# Patient Record
Sex: FEMALE | Race: BLACK OR AFRICAN AMERICAN | Employment: UNEMPLOYED | URBAN - METROPOLITAN AREA
[De-identification: names, ages, dates, MRNs, and addresses within clinical notes are randomized per-mention and may not be internally consistent; named-entity substitution may affect disease eponyms.]

---

## 2020-04-04 ENCOUNTER — APPOINTMENT (OUTPATIENT)
Dept: GENERAL RADIOLOGY | Age: 50
DRG: 917 | End: 2020-04-04

## 2020-04-04 ENCOUNTER — HOSPITAL ENCOUNTER (INPATIENT)
Age: 50
LOS: 1 days | Discharge: HOME | DRG: 917 | End: 2020-04-05
Attending: EMERGENCY MEDICINE | Admitting: INTERNAL MEDICINE

## 2020-04-04 PROBLEM — T50.901A DRUG OVERDOSE: Status: ACTIVE | Noted: 2020-04-04

## 2020-04-04 PROBLEM — G93.41 ACUTE METABOLIC ENCEPHALOPATHY: Status: ACTIVE | Noted: 2020-04-04

## 2020-04-04 PROBLEM — I48.91 ATRIAL FIBRILLATION WITH RVR (HCC): Status: ACTIVE | Noted: 2020-04-04

## 2020-04-04 PROBLEM — E87.20 METABOLIC ACIDOSIS: Status: ACTIVE | Noted: 2020-04-04

## 2020-04-04 PROBLEM — R65.10 SIRS (SYSTEMIC INFLAMMATORY RESPONSE SYNDROME) (HCC): Status: ACTIVE | Noted: 2020-04-04

## 2020-04-04 LAB
A/G RATIO: 1.4 (ref 1.1–2.2)
ALBUMIN SERPL-MCNC: 4.5 G/DL (ref 3.4–5)
ALP BLD-CCNC: 81 U/L (ref 40–129)
ALT SERPL-CCNC: 22 U/L (ref 10–40)
AMPHETAMINE SCREEN, URINE: ABNORMAL
ANION GAP SERPL CALCULATED.3IONS-SCNC: 15 MMOL/L (ref 3–16)
ANION GAP SERPL CALCULATED.3IONS-SCNC: 26 MMOL/L (ref 3–16)
AST SERPL-CCNC: 35 U/L (ref 15–37)
BARBITURATE SCREEN URINE: ABNORMAL
BASOPHILS ABSOLUTE: 0.1 K/UL (ref 0–0.2)
BASOPHILS RELATIVE PERCENT: 0.6 %
BENZODIAZEPINE SCREEN, URINE: ABNORMAL
BILIRUB SERPL-MCNC: <0.2 MG/DL (ref 0–1)
BILIRUBIN URINE: NEGATIVE
BLOOD, URINE: ABNORMAL
BUN BLDV-MCNC: 10 MG/DL (ref 7–20)
BUN BLDV-MCNC: 11 MG/DL (ref 7–20)
CALCIUM SERPL-MCNC: 8.1 MG/DL (ref 8.3–10.6)
CALCIUM SERPL-MCNC: 9.3 MG/DL (ref 8.3–10.6)
CANNABINOID SCREEN URINE: ABNORMAL
CHLORIDE BLD-SCNC: 102 MMOL/L (ref 99–110)
CHLORIDE BLD-SCNC: 108 MMOL/L (ref 99–110)
CLARITY: ABNORMAL
CO2: 14 MMOL/L (ref 21–32)
CO2: 20 MMOL/L (ref 21–32)
COCAINE METABOLITE SCREEN URINE: POSITIVE
COLOR: YELLOW
COMMENT UA: ABNORMAL
CREAT SERPL-MCNC: 0.6 MG/DL (ref 0.6–1.1)
CREAT SERPL-MCNC: 1 MG/DL (ref 0.6–1.1)
EKG ATRIAL RATE: 119 BPM
EKG DIAGNOSIS: NORMAL
EKG Q-T INTERVAL: 322 MS
EKG QRS DURATION: 86 MS
EKG QTC CALCULATION (BAZETT): 471 MS
EKG R AXIS: 7 DEGREES
EKG T AXIS: 8 DEGREES
EKG VENTRICULAR RATE: 129 BPM
EOSINOPHILS ABSOLUTE: 0.3 K/UL (ref 0–0.6)
EOSINOPHILS RELATIVE PERCENT: 1.8 %
EPITHELIAL CELLS, UA: 6 /HPF (ref 0–5)
ESTIMATED AVERAGE GLUCOSE: 99.7 MG/DL
GFR AFRICAN AMERICAN: >60
GFR AFRICAN AMERICAN: >60
GFR NON-AFRICAN AMERICAN: 59
GFR NON-AFRICAN AMERICAN: >60
GLOBULIN: 3.3 G/DL
GLUCOSE BLD-MCNC: 113 MG/DL (ref 70–99)
GLUCOSE BLD-MCNC: 132 MG/DL (ref 70–99)
GLUCOSE BLD-MCNC: 296 MG/DL (ref 70–99)
GLUCOSE BLD-MCNC: 87 MG/DL (ref 70–99)
GLUCOSE BLD-MCNC: 95 MG/DL (ref 70–99)
GLUCOSE URINE: 250 MG/DL
HBA1C MFR BLD: 5.1 %
HCT VFR BLD CALC: 44.9 % (ref 36–48)
HEMOGLOBIN: 13.5 G/DL (ref 12–16)
HYALINE CASTS: 5 /LPF (ref 0–8)
KETONES, URINE: NEGATIVE MG/DL
LACTIC ACID: 1.5 MMOL/L (ref 0.4–2)
LACTIC ACID: 1.5 MMOL/L (ref 0.4–2)
LACTIC ACID: 1.6 MMOL/L (ref 0.4–2)
LACTIC ACID: 2 MMOL/L (ref 0.4–2)
LACTIC ACID: 3.6 MMOL/L (ref 0.4–2)
LEUKOCYTE ESTERASE, URINE: NEGATIVE
LIPASE: 42 U/L (ref 13–60)
LV EF: 58 %
LVEF MODALITY: NORMAL
LYMPHOCYTES ABSOLUTE: 3.4 K/UL (ref 1–5.1)
LYMPHOCYTES RELATIVE PERCENT: 18.7 %
Lab: ABNORMAL
MCH RBC QN AUTO: 29.4 PG (ref 26–34)
MCHC RBC AUTO-ENTMCNC: 30.1 G/DL (ref 31–36)
MCV RBC AUTO: 97.5 FL (ref 80–100)
METHADONE SCREEN, URINE: ABNORMAL
MICROSCOPIC EXAMINATION: YES
MONOCYTES ABSOLUTE: 0.4 K/UL (ref 0–1.3)
MONOCYTES RELATIVE PERCENT: 2.1 %
NEUTROPHILS ABSOLUTE: 14 K/UL (ref 1.7–7.7)
NEUTROPHILS RELATIVE PERCENT: 76.8 %
NITRITE, URINE: NEGATIVE
OPIATE SCREEN URINE: ABNORMAL
OXYCODONE URINE: ABNORMAL
PDW BLD-RTO: 15.7 % (ref 12.4–15.4)
PERFORMED ON: ABNORMAL
PERFORMED ON: ABNORMAL
PERFORMED ON: NORMAL
PH UA: 6
PH UA: 6 (ref 5–8)
PHENCYCLIDINE SCREEN URINE: ABNORMAL
PLATELET # BLD: 326 K/UL (ref 135–450)
PMV BLD AUTO: 7 FL (ref 5–10.5)
POTASSIUM REFLEX MAGNESIUM: 4 MMOL/L (ref 3.5–5.1)
POTASSIUM SERPL-SCNC: 3.7 MMOL/L (ref 3.5–5.1)
PRO-BNP: 22 PG/ML (ref 0–124)
PROPOXYPHENE SCREEN: ABNORMAL
PROTEIN UA: ABNORMAL MG/DL
RBC # BLD: 4.6 M/UL (ref 4–5.2)
RBC UA: ABNORMAL /HPF (ref 0–4)
SODIUM BLD-SCNC: 142 MMOL/L (ref 136–145)
SODIUM BLD-SCNC: 143 MMOL/L (ref 136–145)
SPECIFIC GRAVITY UA: 1.01 (ref 1–1.03)
TOTAL CK: 221 U/L (ref 26–192)
TOTAL PROTEIN: 7.8 G/DL (ref 6.4–8.2)
TROPONIN: 0.02 NG/ML
TROPONIN: 0.02 NG/ML
TROPONIN: <0.01 NG/ML
TROPONIN: <0.01 NG/ML
TSH REFLEX: 3.73 UIU/ML (ref 0.27–4.2)
URINE REFLEX TO CULTURE: YES
URINE TYPE: ABNORMAL
UROBILINOGEN, URINE: 0.2 E.U./DL
WBC # BLD: 18.3 K/UL (ref 4–11)
WBC UA: 11 /HPF (ref 0–5)

## 2020-04-04 PROCEDURE — 83036 HEMOGLOBIN GLYCOSYLATED A1C: CPT

## 2020-04-04 PROCEDURE — 83880 ASSAY OF NATRIURETIC PEPTIDE: CPT

## 2020-04-04 PROCEDURE — 71045 X-RAY EXAM CHEST 1 VIEW: CPT

## 2020-04-04 PROCEDURE — 2060000000 HC ICU INTERMEDIATE R&B

## 2020-04-04 PROCEDURE — 84484 ASSAY OF TROPONIN QUANT: CPT

## 2020-04-04 PROCEDURE — 83690 ASSAY OF LIPASE: CPT

## 2020-04-04 PROCEDURE — 83605 ASSAY OF LACTIC ACID: CPT

## 2020-04-04 PROCEDURE — 87086 URINE CULTURE/COLONY COUNT: CPT

## 2020-04-04 PROCEDURE — 93005 ELECTROCARDIOGRAM TRACING: CPT | Performed by: EMERGENCY MEDICINE

## 2020-04-04 PROCEDURE — 6370000000 HC RX 637 (ALT 250 FOR IP): Performed by: INTERNAL MEDICINE

## 2020-04-04 PROCEDURE — 2580000003 HC RX 258: Performed by: INTERNAL MEDICINE

## 2020-04-04 PROCEDURE — 93010 ELECTROCARDIOGRAM REPORT: CPT | Performed by: INTERNAL MEDICINE

## 2020-04-04 PROCEDURE — 99291 CRITICAL CARE FIRST HOUR: CPT

## 2020-04-04 PROCEDURE — 2500000003 HC RX 250 WO HCPCS: Performed by: INTERNAL MEDICINE

## 2020-04-04 PROCEDURE — 84443 ASSAY THYROID STIM HORMONE: CPT

## 2020-04-04 PROCEDURE — 85025 COMPLETE CBC W/AUTO DIFF WBC: CPT

## 2020-04-04 PROCEDURE — 2500000003 HC RX 250 WO HCPCS: Performed by: EMERGENCY MEDICINE

## 2020-04-04 PROCEDURE — 96360 HYDRATION IV INFUSION INIT: CPT

## 2020-04-04 PROCEDURE — 80307 DRUG TEST PRSMV CHEM ANLYZR: CPT

## 2020-04-04 PROCEDURE — 2580000003 HC RX 258: Performed by: EMERGENCY MEDICINE

## 2020-04-04 PROCEDURE — 82550 ASSAY OF CK (CPK): CPT

## 2020-04-04 PROCEDURE — 6360000002 HC RX W HCPCS: Performed by: INTERNAL MEDICINE

## 2020-04-04 PROCEDURE — 80053 COMPREHEN METABOLIC PANEL: CPT

## 2020-04-04 PROCEDURE — 81001 URINALYSIS AUTO W/SCOPE: CPT

## 2020-04-04 PROCEDURE — 36415 COLL VENOUS BLD VENIPUNCTURE: CPT

## 2020-04-04 PROCEDURE — 99255 IP/OBS CONSLTJ NEW/EST HI 80: CPT | Performed by: INTERNAL MEDICINE

## 2020-04-04 PROCEDURE — 93306 TTE W/DOPPLER COMPLETE: CPT

## 2020-04-04 PROCEDURE — 96361 HYDRATE IV INFUSION ADD-ON: CPT

## 2020-04-04 RX ORDER — PROMETHAZINE HYDROCHLORIDE 25 MG/1
12.5 TABLET ORAL EVERY 6 HOURS PRN
Status: DISCONTINUED | OUTPATIENT
Start: 2020-04-04 | End: 2020-04-05 | Stop reason: HOSPADM

## 2020-04-04 RX ORDER — ACETAMINOPHEN 650 MG/1
650 SUPPOSITORY RECTAL EVERY 6 HOURS PRN
Status: DISCONTINUED | OUTPATIENT
Start: 2020-04-04 | End: 2020-04-05 | Stop reason: HOSPADM

## 2020-04-04 RX ORDER — SODIUM CHLORIDE 0.9 % (FLUSH) 0.9 %
10 SYRINGE (ML) INJECTION EVERY 12 HOURS SCHEDULED
Status: DISCONTINUED | OUTPATIENT
Start: 2020-04-04 | End: 2020-04-05 | Stop reason: HOSPADM

## 2020-04-04 RX ORDER — ONDANSETRON 2 MG/ML
4 INJECTION INTRAMUSCULAR; INTRAVENOUS EVERY 6 HOURS PRN
Status: DISCONTINUED | OUTPATIENT
Start: 2020-04-04 | End: 2020-04-05 | Stop reason: HOSPADM

## 2020-04-04 RX ORDER — 0.9 % SODIUM CHLORIDE 0.9 %
1000 INTRAVENOUS SOLUTION INTRAVENOUS ONCE
Status: COMPLETED | OUTPATIENT
Start: 2020-04-04 | End: 2020-04-04

## 2020-04-04 RX ORDER — NICOTINE 21 MG/24HR
1 PATCH, TRANSDERMAL 24 HOURS TRANSDERMAL DAILY PRN
Status: DISCONTINUED | OUTPATIENT
Start: 2020-04-04 | End: 2020-04-05 | Stop reason: HOSPADM

## 2020-04-04 RX ORDER — DEXTROSE MONOHYDRATE 25 G/50ML
12.5 INJECTION, SOLUTION INTRAVENOUS PRN
Status: DISCONTINUED | OUTPATIENT
Start: 2020-04-04 | End: 2020-04-05 | Stop reason: HOSPADM

## 2020-04-04 RX ORDER — 0.9 % SODIUM CHLORIDE 0.9 %
500 INTRAVENOUS SOLUTION INTRAVENOUS ONCE
Status: COMPLETED | OUTPATIENT
Start: 2020-04-04 | End: 2020-04-04

## 2020-04-04 RX ORDER — NICOTINE POLACRILEX 4 MG
15 LOZENGE BUCCAL PRN
Status: DISCONTINUED | OUTPATIENT
Start: 2020-04-04 | End: 2020-04-05 | Stop reason: HOSPADM

## 2020-04-04 RX ORDER — DEXTROSE MONOHYDRATE 50 MG/ML
100 INJECTION, SOLUTION INTRAVENOUS PRN
Status: DISCONTINUED | OUTPATIENT
Start: 2020-04-04 | End: 2020-04-05 | Stop reason: HOSPADM

## 2020-04-04 RX ORDER — SODIUM CHLORIDE 9 MG/ML
INJECTION, SOLUTION INTRAVENOUS CONTINUOUS
Status: DISCONTINUED | OUTPATIENT
Start: 2020-04-04 | End: 2020-04-05 | Stop reason: HOSPADM

## 2020-04-04 RX ORDER — DILTIAZEM HYDROCHLORIDE 5 MG/ML
10 INJECTION INTRAVENOUS ONCE
Status: COMPLETED | OUTPATIENT
Start: 2020-04-04 | End: 2020-04-04

## 2020-04-04 RX ORDER — ACETAMINOPHEN 325 MG/1
650 TABLET ORAL EVERY 6 HOURS PRN
Status: DISCONTINUED | OUTPATIENT
Start: 2020-04-04 | End: 2020-04-05 | Stop reason: HOSPADM

## 2020-04-04 RX ORDER — CARVEDILOL 6.25 MG/1
6.25 TABLET ORAL 2 TIMES DAILY WITH MEALS
Status: DISCONTINUED | OUTPATIENT
Start: 2020-04-04 | End: 2020-04-05 | Stop reason: HOSPADM

## 2020-04-04 RX ORDER — SODIUM CHLORIDE 0.9 % (FLUSH) 0.9 %
10 SYRINGE (ML) INJECTION PRN
Status: DISCONTINUED | OUTPATIENT
Start: 2020-04-04 | End: 2020-04-05 | Stop reason: HOSPADM

## 2020-04-04 RX ORDER — POLYETHYLENE GLYCOL 3350 17 G/17G
17 POWDER, FOR SOLUTION ORAL DAILY PRN
Status: DISCONTINUED | OUTPATIENT
Start: 2020-04-04 | End: 2020-04-05 | Stop reason: HOSPADM

## 2020-04-04 RX ADMIN — Medication 10 ML: at 09:13

## 2020-04-04 RX ADMIN — SODIUM CHLORIDE 500 ML: 9 INJECTION, SOLUTION INTRAVENOUS at 15:35

## 2020-04-04 RX ADMIN — SODIUM CHLORIDE 500 ML: 9 INJECTION, SOLUTION INTRAVENOUS at 03:38

## 2020-04-04 RX ADMIN — ENOXAPARIN SODIUM 40 MG: 40 INJECTION SUBCUTANEOUS at 09:13

## 2020-04-04 RX ADMIN — DILTIAZEM HYDROCHLORIDE 2.5 MG/HR: 5 INJECTION INTRAVENOUS at 17:10

## 2020-04-04 RX ADMIN — SODIUM CHLORIDE: 9 INJECTION, SOLUTION INTRAVENOUS at 09:14

## 2020-04-04 RX ADMIN — CARVEDILOL 6.25 MG: 6.25 TABLET, FILM COATED ORAL at 17:34

## 2020-04-04 RX ADMIN — DILTIAZEM HYDROCHLORIDE 5 MG/HR: 5 INJECTION INTRAVENOUS at 08:35

## 2020-04-04 RX ADMIN — SODIUM CHLORIDE: 9 INJECTION, SOLUTION INTRAVENOUS at 19:03

## 2020-04-04 RX ADMIN — SODIUM CHLORIDE 1000 ML: 9 INJECTION, SOLUTION INTRAVENOUS at 05:25

## 2020-04-04 RX ADMIN — DILTIAZEM HYDROCHLORIDE 10 MG: 5 INJECTION INTRAVENOUS at 06:28

## 2020-04-04 ASSESSMENT — ENCOUNTER SYMPTOMS
ABDOMINAL PAIN: 0
VOMITING: 0
TROUBLE SWALLOWING: 0
COUGH: 0
COLOR CHANGE: 0
SHORTNESS OF BREATH: 0
PHOTOPHOBIA: 0

## 2020-04-04 NOTE — H&P
exam:->sob Reason for Exam: sob; possible overdose; FINDINGS: The lungs are clear. The costophrenic angles are sharp. The heart size is at the upper limits of normal.  There is no discernible pneumothorax. No acute disease. EKG:  afib with RVR, rate 910    PHYSICIAN CERTIFICATION    I certify that Jaya De La Paz is expected to be hospitalized for <2 midnights based on the following assessment and plan:      ASSESSMENT:      Patient Active Problem List   Diagnosis    Uterine fibroid    Tobacco abuse    Atrial fibrillation with RVR ([address]    Drug overdose    SIRS (systemic inflammatory response syndrome) ([address]    Metabolic acidosis    Acute metabolic encephalopathy       Principal Problem:    Atrial fibrillation with RVR (HCC)  Active Problems:    Tobacco abuse    Drug overdose    SIRS (systemic inflammatory response syndrome) (HCC)    Metabolic acidosis    Acute metabolic encephalopathy  Resolved Problems:    * No resolved hospital problems. *      PLAN:    1. Atrial fib with RVR - avoid beta blockers - currently on dilt drip - her CHADS VASC score only one - cardiology consult - could likely be converted to oral cardizem soon and sent home once rate slows  2. Drug overdose - narcan if needed - likely cocaine was cut with fentanyl or heroid  3. SIRS - likely due to overdose with possible aspiration - tachycardia, leukocytosis  4. Metabolic acidosis - likely due to   5.   Acute metabolic encephalopathy - likely due to overdose  6.  tob abuse - nicotine patch  7.  Elevated glucose - check accuchecks and hgba1c    DVT prophylaxis Yes:  lovenox  GI prophylaxis no  Antibiotic prophylaxis:  No    Code status FULL        Please note that over 50 minutes was spent in evaluating the patient, review of records and results, discussion with staff/family, etc.    Electronically signed by Sylvia Armendariz MD on 4/4/2020 at 6:17 AM

## 2020-04-04 NOTE — ED TRIAGE NOTES
Pt found unresponsive in car with O2 sats in the 70's and pinpoint pupils. Pt responded with 2mg of narcan IM. Pt denies drug use.

## 2020-04-04 NOTE — ED PROVIDER NOTES
as hypoglycemia and hyperglycemia    Alterations in perfusions such as hypotension and hypoperfusion    Alterations in electrolytes such as disturbances in sodium or calcium   Infectious processes such as sepsis from a pneumonia or urinary tract infection    Substance use or withdrawal, especially alcohol and drugs    Medication adverse event or interaction    Vitamin deficiencies such as Wernicke's encephalopathy    CNS lesion, injury, infection (CVA, subdural hematoma, meningitis, encephalitis)    Alterations in hormones such as thyroid or adrenal abnormalities    Alterations in cardiac functioning such as arrhythmia, MI or CHF    Alteration in temperature such as hyperthermia or hypothermia    Dehydration, sleep deprivation   Change in medical regimen    Alteration in lifestyle, environment, or personal relationships    -  Work-up included:  See above  -  ED treatment included: See above  -  Results discussed with patient. Labs show anion gap metabolic acidosis as well as hyperglycemia. I suspect this is from a lactic acidosis from the patient's prolonged hypoxia secondary to narcotic overdose. There are no ketones in the patient's urine have a low suspicion for DKA. Patient given 2 L of normal saline and remains in atrial fibrillation with a heart rate of 120-140. imaging studies show no acute cardiopulmonary disease. Patient feels well on reevaluation. Initially she was resistant to admission to the hospital but remained in persistent atrial fibrillation with RVR. Repeat BMP and lactate were ordered after completion of the patient's normal saline. The patient is agreeable with plan of care and disposition.  -  Disposition:  Admission      REASSESSMENT          CRITICAL CARE TIME   Total Critical Care time was 35 minutes, excluding separatelyreportable procedures.   There was a high probability ofclinically significant/life threatening deterioration in the patient's condition which required my urgent

## 2020-04-04 NOTE — ED NOTES
Pt o2 sats dipping into the 80's, er doc made aware.  Pt placed on o2 per nc at [place] [id number], [address] [facility]  04/04/20 6121

## 2020-04-04 NOTE — PLAN OF CARE
Problem: Activity:  Goal: Ability to tolerate increased activity will improve  Description: Ability to tolerate increased activity will improve  Outcome: Ongoing     Problem: Activity:  Goal: Expression of feelings of increased energy will increase  Description: Expression of feelings of increased energy will increase  Outcome: Ongoing     Problem: Cardiac:  Goal: Ability to maintain an adequate cardiac output will improve  Description: Ability to maintain an adequate cardiac output will improve  Outcome: Ongoing     Problem: Coping:  Goal: Level of anxiety will decrease  Description: Level of anxiety will decrease  Outcome: Ongoing     Problem: Coping:  Goal: General experience of comfort will improve  Description: General experience of comfort will improve  Outcome: Ongoing     Problem: Safety:  Goal: Ability to remain free from injury will improve  Description: Ability to remain free from injury will improve  Outcome: Ongoing   Patient assessed for fall risk; fall precautions initiated. Patient and family instructed about safety devices. Environment kept free of clutter and adequate lighting provided. Bed locked and in lowest position. Call light within reach. Will continue to monitor.     Electronically signed by Kevin Lombardo RN on 4/4/2020 at 4:11 PM

## 2020-04-04 NOTE — CONSULTS
44.9        [id number] Score for Atrial Fibrillation Stroke Risk   Risk   Factors  Component Value   C CHF No 0   H HTN No 0   A2 Age >= 75 No,  (48 y.o.) 0   D DM No 0   S2 Prior Stroke/TIA No 0   V Vascular Disease No 0   A Age 74-69 No,  (48 y.o.) 0   Sc Sex female 1    [id number]  Score  1   Score last updated 4/4/20 2:05 AM EDT    Click here for a link to the UpToDate guideline \"Atrial Fibrillation: Anticoagulation therapy to prevent embolization    Disclaimer: Risk Score calculation is dependent on accuracy of patient problem list and past encounter diagnosis. Assessment:  1. Atrial fibrillation with RVR  2. Drug Overdose  3. Cocaine Abuse    Plan:  Cheyenne Doan may have had a-fib for some time or it may be from cocaine abuse. I briefly reviewed her echocardiogram which demonstrated normal LV/RV function and no significant valve abnormalities. I would control her rate with carvedilol 6.25mg bid now and discontinue the Cardizem gtt. Carvedilol over metoprolol due to cocaine abuse and need for alpha blockade. No need for anticoagulation given low CHADS Vasc risk. She can follow-up in our office. She states that she is embarassed by this incident and sawyer not use drugs any more. If her rate is controlled tomorrow she could be discharged to home.

## 2020-04-04 NOTE — PROGRESS NOTES
Patient arrived to room 5129 via stretcher at 6175. A/Ox4, VSS, telemetry box 70 applied and verified Afib with CMU. Call light and bedside table within reach.     Electronically signed by Gretchen Hernandez RN on 4/4/2020 at 9:07 AM

## 2020-04-04 NOTE — PROGRESS NOTES
4 Eyes Skin Assessment     The patient is being assess for  Admission    I agree that 2 RN's have performed a thorough Head to Toe Skin Assessment on the patient. ALL assessment sites listed below have been assessed. Areas assessed by both nurses:   [x]   Head, Face, and Ears   [x]   Shoulders, Back, and Chest  [x]   Arms, Elbows, and Hands   [x]   Coccyx, Sacrum, and IschIum  [x]   Legs, Feet, and Heels        Does the Patient have Skin Breakdown?   No         Eric Prevention initiated:  No   Wound Care Orders initiated:  No      [facility] nurse consulted for Pressure Injury (Stage 3,4, Unstageable, DTI, NWPT, and Complex wounds), New and Established Ostomies:  No      Nurse 1 eSignature: Electronically signed by Noah Guzman RN on 4/4/20 at 5:41 PM EDT    **SHARE this note so that the co-signing nurse is able to place an eSignature**    Nurse 2 eSignature: Electronically signed by Sreedhar Wick RN on 4/4/20 at 5:42 PM EDT

## 2020-04-04 NOTE — PROGRESS NOTES
Please review Dr. Elmira Ross H&P for further information. Patient was seen this morning with Dr. Elmira Ross. This note is to reflect transition of care. Brief assessment and plan:    1. Atrial fibrillation with RVR  -Maintain Cardizem drip for rate control  -Patient is currently on Lovenox for anticoagulation. Await cardiology recommendations.  -Follow-up echocardiogram    2. Drug overdose-drug tox screen positive for cocaine. She has hx recurrent use  -Resolving and improvement in mental status  -Continue IV hydration  -Cessation of drug use advised    3. Anion gap metabolic acidosis-likely due to sepsis  -Improved. Continue IV hydration. 4. SIRS POA-possibly autonomic response. Await blood and urine cultures. Chest x-ray was clear. I will place on antibiotics if patient becomes febrile. 5.  Hyperglycemia-continue glucose checks. Patient has coverage insulin. Follow-up A1c    Disposition-possibly discharge in 2 to 3 days.

## 2020-04-05 VITALS
DIASTOLIC BLOOD PRESSURE: 73 MMHG | HEIGHT: 64 IN | OXYGEN SATURATION: 94 % | BODY MASS INDEX: 24.09 KG/M2 | WEIGHT: 141.09 LBS | RESPIRATION RATE: 16 BRPM | HEART RATE: 86 BPM | SYSTOLIC BLOOD PRESSURE: 116 MMHG | TEMPERATURE: 98 F

## 2020-04-05 LAB
ANION GAP SERPL CALCULATED.3IONS-SCNC: 13 MMOL/L (ref 3–16)
BUN BLDV-MCNC: 12 MG/DL (ref 7–20)
CALCIUM SERPL-MCNC: 8.6 MG/DL (ref 8.3–10.6)
CHLORIDE BLD-SCNC: 106 MMOL/L (ref 99–110)
CO2: 22 MMOL/L (ref 21–32)
CREAT SERPL-MCNC: 0.7 MG/DL (ref 0.6–1.1)
GFR AFRICAN AMERICAN: >60
GFR NON-AFRICAN AMERICAN: >60
GLUCOSE BLD-MCNC: 108 MG/DL (ref 70–99)
GLUCOSE BLD-MCNC: 113 MG/DL (ref 70–99)
GLUCOSE BLD-MCNC: 138 MG/DL (ref 70–99)
INR BLD: 1.17 (ref 0.86–1.14)
MAGNESIUM: 1.9 MG/DL (ref 1.8–2.4)
PERFORMED ON: ABNORMAL
PERFORMED ON: ABNORMAL
POTASSIUM SERPL-SCNC: 3.5 MMOL/L (ref 3.5–5.1)
PROTHROMBIN TIME: 13.6 SEC (ref 10–13.2)
SODIUM BLD-SCNC: 141 MMOL/L (ref 136–145)
URINE CULTURE, ROUTINE: NORMAL

## 2020-04-05 PROCEDURE — 36415 COLL VENOUS BLD VENIPUNCTURE: CPT

## 2020-04-05 PROCEDURE — 2580000003 HC RX 258: Performed by: INTERNAL MEDICINE

## 2020-04-05 PROCEDURE — 6370000000 HC RX 637 (ALT 250 FOR IP): Performed by: INTERNAL MEDICINE

## 2020-04-05 PROCEDURE — 6360000002 HC RX W HCPCS: Performed by: INTERNAL MEDICINE

## 2020-04-05 PROCEDURE — 85610 PROTHROMBIN TIME: CPT

## 2020-04-05 PROCEDURE — 99232 SBSQ HOSP IP/OBS MODERATE 35: CPT | Performed by: NURSE PRACTITIONER

## 2020-04-05 PROCEDURE — 80048 BASIC METABOLIC PNL TOTAL CA: CPT

## 2020-04-05 PROCEDURE — 83735 ASSAY OF MAGNESIUM: CPT

## 2020-04-05 PROCEDURE — 6370000000 HC RX 637 (ALT 250 FOR IP): Performed by: NURSE PRACTITIONER

## 2020-04-05 RX ORDER — CARVEDILOL 6.25 MG/1
6.25 TABLET ORAL 2 TIMES DAILY WITH MEALS
Qty: 60 TABLET | Refills: 3 | Status: SHIPPED | OUTPATIENT
Start: 2020-04-05

## 2020-04-05 RX ORDER — CETIRIZINE HYDROCHLORIDE 10 MG/1
5 TABLET ORAL DAILY PRN
Status: DISCONTINUED | OUTPATIENT
Start: 2020-04-05 | End: 2020-04-05 | Stop reason: HOSPADM

## 2020-04-05 RX ORDER — TRAMADOL HYDROCHLORIDE 50 MG/1
50 TABLET ORAL PRN
Status: DISCONTINUED | OUTPATIENT
Start: 2020-04-05 | End: 2020-04-05 | Stop reason: HOSPADM

## 2020-04-05 RX ORDER — CLONIDINE HYDROCHLORIDE 0.1 MG/1
0.1 TABLET ORAL PRN
Status: DISCONTINUED | OUTPATIENT
Start: 2020-04-05 | End: 2020-04-05 | Stop reason: HOSPADM

## 2020-04-05 RX ORDER — TRAZODONE HYDROCHLORIDE 50 MG/1
50 TABLET ORAL NIGHTLY PRN
Status: DISCONTINUED | OUTPATIENT
Start: 2020-04-05 | End: 2020-04-05 | Stop reason: HOSPADM

## 2020-04-05 RX ADMIN — ENOXAPARIN SODIUM 40 MG: 40 INJECTION SUBCUTANEOUS at 08:34

## 2020-04-05 RX ADMIN — CETIRIZINE HYDROCHLORIDE 5 MG: 10 TABLET, FILM COATED ORAL at 12:10

## 2020-04-05 RX ADMIN — CARVEDILOL 6.25 MG: 6.25 TABLET, FILM COATED ORAL at 08:34

## 2020-04-05 RX ADMIN — SODIUM CHLORIDE: 9 INJECTION, SOLUTION INTRAVENOUS at 09:00

## 2020-04-05 RX ADMIN — Medication 10 ML: at 08:39

## 2020-04-05 ASSESSMENT — PAIN SCALES - GENERAL: PAINLEVEL_OUTOF10: 0

## 2020-04-05 NOTE — DISCHARGE SUMMARY
Hospitalist Discharge Summary    Patient ID:  Karely Lewis  [id number]  52 y.o.  1970    Admit date: 4/4/2020    Discharge date: 4/5/2020    Disposition: home    Admission Diagnoses:   Patient Active Problem List   Diagnosis    Uterine fibroid    Tobacco abuse    Atrial fibrillation with RVR ([address]    Drug overdose    SIRS (systemic inflammatory response syndrome) (HCC)    Metabolic acidosis    Acute metabolic encephalopathy       Discharge Diagnoses: Principal Problem:    Atrial fibrillation with RVR (HCC)  Active Problems:    Tobacco abuse    Drug overdose    SIRS (systemic inflammatory response syndrome) (HCC)    Metabolic acidosis    Acute metabolic encephalopathy  Resolved Problems:    * No resolved hospital problems. *      Code Status:  Full Code    Condition:  Stable    Discharge Diet: Diet:  DIET GENERAL;    PCP to do list:        Hospital Course:     Patient was brought here after she was found unresponsive in her car. She received Narcan secondary to possible drug overdose. He developed new onset atrial fibrillation. She was seen by cardiology. She was treated with Cardizem drip. Today she was switched to carvedilol . She is doing better and she is being discharged to follow-up with her primary care as an outpatient. Atrial fibrillation with RVR  Cardizem drip was stopped this morning. Rate control with carvedilol.   Echocardiogram normal ejection fraction without any significant findings  Continue with carvedilol  [id number] Score is low no need for anticoagulation     Drug overdose  Drug screen was positive for cocaine  Counseling was provided  No signs of withdrawal at this moment     High anion gap metabolic acidosis:  Improved     SIRS:  Present on admission  Afebrile  No signs of infection  Patient is not septic  Most likely secondary to drug overdose     Hyperglycemia        Lab Results   Component Value Date     LABA1C 5.1 04/04/2020   No

## 2020-04-05 NOTE — PROGRESS NOTES
[address]   Daily Progress Note      Admit Date:  4/4/2020    CC: unresponsive    HPI:   Reed Olmos is a 52 y.o. female with PMH elicit drug use. dmitted to [facility] after being found unresponsive. EMS suspected drug overdose and administered Narcan which was successful in arousing her. On admission, she admitted to snorting cocaine. She was found in AF and Dr. Dian Almanzar was consulted. Cardizem gtt has been DC'd and she is on Coreg. Today she is feeling OK. She admits to ongoing fatigue. She denies SOB or chest pain. She admits to being an every day smoker. She remains in AF HR controlled but denies palpitations, LH or dizziness. Review of Systems:   General: Denies fever, chills, +fatigue  Skin: Denies skin changes, rash, itching, lesions.   HEENT: Denies headache, dizziness, vision changes, nosebleeds, sore throat, nasal drainage  RESP: Denies cough, sputum, dyspnea, wheeze, snoring  CARD: Denies palpitations,  murmur  GI:Denies nausea, vomiting, heartburn, loss of appetite, change in bowels  : Denies frequency, pain, incontinence, polyuria  VASC: Denies claudication, leg cramps, clots  MUSC/SKEL: Denies pain, stiffness, arthritis  PSYCH: Denies anxiety, depression, stress  NEURO: Denies numbness, tingling, weakness,change in mood or memory  HEME: Denies abn bruising, bleeding, anemia  ENDO: Denies intolerance to heat, cold, excessive thirst or hunger, hx thyroid disease    Objective:   /73   Pulse 68   Temp 98 °F (36.7 °C) (Oral)   Resp 16   Ht 5' 4\" (1.626 m)   Wt 141 lb 1.5 oz (64 kg)   SpO2 95%   BMI 24.22 kg/m²        Intake/Output Summary (Last 24 hours) at 4/5/2020 1036  Last data filed at 4/5/2020 0936  Gross per 24 hour   Intake 4059.77 ml   Output 2200 ml   Net 1859.77 ml       WEIGHT:Admit Weight: 135 lb (61.2 kg)         Today  Weight: 141 lb 1.5 oz (64 kg)   DRY WEIGHT:  Wt Readings from Last 3 Encounters:   04/05/20 141 lb 1.5 oz (64 kg)   08/25/15 138 lb (62.6 kg)   03/20/12 132 lb (59.9 kg)       Physical Exam:  GEN: Appears well, no acute distress  SKIN: Pink, warm, dry. Nails without clubbing. HEENT: PERRLA. Normocephalic, atraumatic. Neck supple. No adenopathy. LUNG: AP diameter normal. Diminished BS w/ mild bilateral exp wheeze. Respiratory effort normal.  HEART: S1S2 A/R. No JVD. No carotid bruit. No murmur, rub or gallop. ABD: Soft, nontender. +BS X 4 quads. No hepatomegaly. EXT: Radial and pedal pulses 2+ and symmetric. Without varicosities. No edema. MUSCSKEL: Good ROM X4 extremities. No deformity. NEURO: A/O X3. Calm and cooperative. Telemetry: AF HR 80s    Medications:    sodium chloride flush  10 mL Intravenous 2 times per day    enoxaparin  40 mg Subcutaneous Daily    insulin lispro  0-6 Units Subcutaneous TID WC    insulin lispro  0-3 Units Subcutaneous Nightly    carvedilol  6.25 mg Oral BID WC      sodium chloride 75 mL/hr at 04/05/20 0900    dextrose       traZODone, traMADol **AND** cloNIDine, sodium chloride flush, acetaminophen **OR** acetaminophen, polyethylene glycol, promethazine **OR** ondansetron, perflutren lipid microspheres, nicotine, glucose, dextrose, glucagon (rDNA), dextrose    Lab Data: I have reviewed all labs below today.    CBC:   Recent Labs     04/04/20  0320   WBC 18.3*   HGB 13.5   HCT 44.9   MCV 97.5        BMP:   Recent Labs     04/04/20  0320 04/04/20  0633 04/05/20  0540    143 141   K 4.0 3.7 3.5    108 106   CO2 14* 20* 22   BUN 11 10 12   CREATININE 1.0 0.6 0.7     GLUCOSE:   Recent Labs     04/04/20  0320 04/04/20  0633 04/05/20  0540   GLUCOSE 296* 87 113*     LIVER PROFILE:   Lab Results   Component Value Date    AST 35 04/04/2020    ALT 22 04/04/2020    LIPASE 42.0 04/04/2020    LABALBU 4.5 04/04/2020    BILITOT <0.2 04/04/2020    ALKPHOS 81 04/04/2020     PT/INR:   Lab Results   Component Value Date    PROTIME 13.6 04/05/2020    INR 1.17 04/05/2020     APTT: No results found

## 2020-04-05 NOTE — PLAN OF CARE
Problem: Activity:  Goal: Ability to tolerate increased activity will improve  Description: Ability to tolerate increased activity will improve  4/5/2020 1257 by Kirill Betancourt RN  Outcome: Ongoing     Problem:  Activity:  Goal: Expression of feelings of increased energy will increase  Description: Expression of feelings of increased energy will increase  4/5/2020 1257 by Kirill Betancourt RN  Outcome: Ongoing     Problem: Cardiac:  Goal: Ability to maintain an adequate cardiac output will improve  Description: Ability to maintain an adequate cardiac output will improve  4/5/2020 1257 by Kirill Betancourt RN  Outcome: Ongoing     Problem: Cardiac:  Goal: Complications related to the disease process, condition or treatment will be avoided or minimized  Description: Complications related to the disease process, condition or treatment will be avoided or minimized  4/5/2020 1257 by Kirill Betancourt RN  Outcome: Ongoing     Problem: Coping:  Goal: Level of anxiety will decrease  Description: Level of anxiety will decrease  4/5/2020 1257 by Kirill Betancourt RN  Outcome: Ongoing     Problem: Coping:  Goal: General experience of comfort will improve  Description: General experience of comfort will improve  4/5/2020 1257 by Kirill Betancourt RN  Outcome: Ongoing     Problem: Health Behavior:  Goal: Ability to manage health-related needs will improve  Description: Ability to manage health-related needs will improve  4/5/2020 1257 by Kirill Betancourt RN  Outcome: Ongoing     Problem: Safety:  Goal: Ability to remain free from injury will improve  Description: Ability to remain free from injury will improve  4/5/2020 1257 by Kirill Betancourt RN  Outcome: Ongoing     Problem: Safety:  Goal: Will show no signs and symptoms of excessive bleeding  Description: Will show no signs and symptoms of excessive bleeding  4/5/2020 1257 by Kirill Betancourt RN  Outcome: Ongoing   Electronically signed by Lisa Jacobs RN on 4/5/2020 at 12:57 PM

## 2020-04-06 ENCOUNTER — TELEPHONE (OUTPATIENT)
Dept: CARDIOLOGY CLINIC | Age: 50
End: 2020-04-06

## 2020-04-07 NOTE — TELEPHONE ENCOUNTER
Tried to call [facility] to schedule, went straight to  and said they are not accepting calls at this time, unable to LVM. Will try again tomorrow.